# Patient Record
(demographics unavailable — no encounter records)

---

## 2024-11-05 NOTE — DISCUSSION/SUMMARY
[de-identified] : (Impression) -right shoulder superior labral tear versus rotator cuff tendonitis  The diagnosis was explained in detail. The potential non-surgical and surgical treatments were reviewed. The relative risks and benefits of each option were considered relative to the patient age, activity level, medical history, symptom severity and previously attempted treatments.  The patient was advised to consult with their primary medical provider prior to initiation of any new medications to reduce the risk of adverse effects specific to their long-term home medications and medical history. The risk of gastrointestinal irritation and kidney injury specific to long-term NSAID use was discussed.    (Plan)  -MRI arthrogram of the shoulder recommended to evaluate for labral and/or rotator cuff tear.  -Celebrex for pain and inflammation, take as needed. -Recommend rest from throwing at this time.  -Follow up when imaging completed. Will llikely consider PT before surgical options.      (MDM) Problem Complexity -Moderate: acute, complicated injury   Risk -Moderate: prescription medication  Visit Type -New: Patient has not been seen by another provider in my practice within the past 2 years who specializes in orthopedic surgery.

## 2024-11-05 NOTE — IMAGING
[de-identified] : (Exam: Shoulder)   Laterality is right    Patient is in no acute distress, alert and oriented Sensation is grossly intact to light touch in the hand Motor function is 5/5 in the hand Capillary refill is less than 2 seconds in the fingers Lymphadenopathy is not present Peripheral edema is not present   Skin is intact Swelling is not present Atrophy is not present Scapular winging is not present Deformity of the AC joint is not present Deformity of the biceps is not present   Bicipital groove tenderness is present AC joint tenderness is not present Scapulothoracic tenderness is not present   Active forward elevation is 170 Passive forward elevation is 170 External rotation at the side is 60 Internal rotation behind the back is to the level of T12   Forward elevation strength is 4/5 External rotation strength at the side is 4/5 Internal rotation strength at the side is 5/5 Deltoid strength of anterior, posterior and lateral heads is 5/5   Walters test is abnormal OBriens test is abnormal Empty can test is abnormal Speeds test is abnormal Cross body adduction test is normal Belly press test is normal Apprehension and relocation is normal Sulcus sign is normal [Right] : right shoulder [There are no fractures, subluxations or dislocations. No significant abnormalities are seen] : There are no fractures, subluxations or dislocations. No significant abnormalities are seen

## 2024-11-05 NOTE — HISTORY OF PRESENT ILLNESS
[de-identified] : Date of initial evaluation: 11/05/2024 Patient age: 17 year Body part causing symptoms: right shoulder Location of the pain: posterior, anterior Pain score today: 4/10 Duration of symptoms: 1 month History of injury: patient plays college baseball. reports gradual pain in the shoulder with throwing, likely overuse without an acute injury. reports preceding history of elbow injury that may have altered his mechanics.  Activities that worsen the pain: lifting weights, throwing Radicular symptoms: none Medications attempted for this problem: none PT for this problem: none Injections for this problem: none Previous surgery on this body part: none Prior imaging: none Occupation: student

## 2024-11-26 NOTE — DATA REVIEWED
[MRI] : MRI [Right] : of the right [Shoulder] : shoulder [I independently reviewed and interpreted images and report] : I independently reviewed and interpreted images and report [FreeTextEntry1] : intact labrum and rotator cuff

## 2024-11-26 NOTE — HISTORY OF PRESENT ILLNESS
[de-identified] : 11/26/2024: f/u for right shoulder, MRI results.  Taking Celebrex prn.  Has been resting from throwing.  Reports no change in symptoms since last visit.   Date of initial evaluation: 11/05/2024 Patient age: 17 year Body part causing symptoms: right shoulder Location of the pain: posterior, anterior Pain score today: 4/10 Duration of symptoms: 1 month History of injury: patient plays college baseball. reports gradual pain in the shoulder with throwing, likely overuse without an acute injury. reports preceding history of elbow injury that may have altered his mechanics.  Activities that worsen the pain: lifting weights, throwing Radicular symptoms: none Medications attempted for this problem: none PT for this problem: none Injections for this problem: none Previous surgery on this body part: none Prior imaging: none Occupation: student

## 2024-11-26 NOTE — DISCUSSION/SUMMARY
[de-identified] : (Impression) -right shoulder rotator cuff tendonitis  The diagnosis was explained in detail. The potential non-surgical and surgical treatments were reviewed. The relative risks and benefits of each option were considered relative to the patient age, activity level, medical history, symptom severity and previously attempted treatments.  The patient was advised to consult with their primary medical provider prior to initiation of any new medications to reduce the risk of adverse effects specific to their long-term home medications and medical history. The risk of gastrointestinal irritation and kidney injury specific to long-term NSAID use was discussed.    (Plan)  -Physical therapy recommended for stretching and strengthening. -Celebrex for pain and inflammation, take as needed. -Recommend rest from throwing for 1 month then return as symptoms permit.  -Follow up in 2 months, if symptoms persist consider CSI.      (MDM) Problem Complexity -Moderate: acute, complicated injury   Risk -Moderate: prescription medication  Visit Type -Established

## 2024-11-26 NOTE — IMAGING
[Right] : right shoulder [There are no fractures, subluxations or dislocations. No significant abnormalities are seen] : There are no fractures, subluxations or dislocations. No significant abnormalities are seen [de-identified] : (Exam: Shoulder)   Laterality is right    Patient is in no acute distress, alert and oriented Sensation is grossly intact to light touch in the hand Motor function is 5/5 in the hand Capillary refill is less than 2 seconds in the fingers Lymphadenopathy is not present Peripheral edema is not present   Skin is intact Swelling is not present Atrophy is not present Scapular winging is not present Deformity of the AC joint is not present Deformity of the biceps is not present   Bicipital groove tenderness is present AC joint tenderness is not present Scapulothoracic tenderness is not present   Active forward elevation is 170 Passive forward elevation is 170 External rotation at the side is 60 Internal rotation behind the back is to the level of T12   Forward elevation strength is 4/5 External rotation strength at the side is 4/5 Internal rotation strength at the side is 5/5 Deltoid strength of anterior, posterior and lateral heads is 5/5   Walters test is abnormal OBriens test is abnormal Empty can test is abnormal Speeds test is abnormal Cross body adduction test is normal Belly press test is normal Apprehension and relocation is normal Sulcus sign is normal

## 2025-03-17 NOTE — DISCUSSION/SUMMARY
[de-identified] : (Impression) -right cubital tunnel syndrome vs cervical radiculopathy -right shoulder rotator cuff tendonitis (resolved)  The diagnosis was explained in detail. The potential non-surgical and surgical treatments were reviewed. The relative risks and benefits of each option were considered relative to the patient age, activity level, medical history, symptom severity and previously attempted treatments.  The patient was advised to consult with their primary medical provider prior to initiation of any new medications to reduce the risk of adverse effects specific to their long-term home medications and medical history. The risk of gastrointestinal irritation and kidney injury specific to long-term NSAID use was discussed.    (Plan)  -EMG of right upper extremity ordered to evaluate for peripheral vs cervical nerve dysfunction.  -Continue shoulder physical therapy, transition to HEP. -Celebrex for pain and inflammation, take as needed. -Activity as tolerated.  -MRI deferred at this time.  -Follow up when EMG completed.      (MDM) Problem Complexity -Moderate: chronic illness with exacerbation   Risk -Moderate: prescription medication  Visit Type -Established

## 2025-03-17 NOTE — HISTORY OF PRESENT ILLNESS
[de-identified] : 03/17/2025: f/u for right shoulder. reports shooting pains from his posterior arm and medial elbow into his fingers, with associated numbness. he states that shoulder pain is improved from last visit with PT. taking Celebrex prn. attending PT for his shoulder. he reports history of low grade partial elbow UCL tear that resolved without surgery. he reports mild neck pain. he denies focal injury associated with his symptoms.   11/26/2024: f/u for right shoulder, MRI results.  Taking Celebrex prn.  Has been resting from throwing.  Reports no change in symptoms since last visit.   Date of initial evaluation: 11/05/2024 Patient age: 17 year Body part causing symptoms: right shoulder Location of the pain: posterior, anterior Pain score today: 4/10 Duration of symptoms: 1 month History of injury: patient plays college baseball. reports gradual pain in the shoulder with throwing, likely overuse without an acute injury. reports preceding history of elbow injury that may have altered his mechanics.  Activities that worsen the pain: lifting weights, throwing Radicular symptoms: none Medications attempted for this problem: none PT for this problem: none Injections for this problem: none Previous surgery on this body part: none Prior imaging: none Occupation: student

## 2025-03-17 NOTE — IMAGING
[Right] : right shoulder [There are no fractures, subluxations or dislocations. No significant abnormalities are seen] : There are no fractures, subluxations or dislocations. No significant abnormalities are seen [de-identified] : (Exam: Shoulder)   Laterality is right    Patient is in no acute distress, alert and oriented Sensation is grossly intact to light touch in the hand Motor function is 5/5 in the hand Capillary refill is less than 2 seconds in the fingers Lymphadenopathy is not present Peripheral edema is not present   Skin is intact Swelling is not present Atrophy is not present Scapular winging is not present Deformity of the AC joint is not present Deformity of the biceps is not present   Bicipital groove tenderness is not present AC joint tenderness is not present Scapulothoracic tenderness is not present   Active forward elevation is 175 Passive forward elevation is 175 External rotation at the side is 80 Internal rotation behind the back is to the level of T7   Forward elevation strength is 5/5 External rotation strength at the side is 5/5 Internal rotation strength at the side is 5/5 Deltoid strength of anterior, posterior and lateral heads is 5/5   Walters test is normal OBriens test is normal Empty can test is normal Speeds test is normal Cross body adduction test is normal Belly press test is normal Apprehension and relocation is normal Sulcus sign is normal  (Exam: Elbow)   Laterality is right    Skin is intact Olecranon bursa swelling is not present Biceps deformity is not present Intrinsic atrophy is not present   Lateral epicondyle tenderness is not present Medial epicondyle tenderness is not present Ulnar nerve tenderness is present in cubital tunnel Radial head tenderness is not present Biceps tenderness is not present Triceps tenderness is not present   Extension is 0 Flexion is 140 Pronation is 80 Supination is 80   Flexion strength is 5/5 Extension strength is 5/5 Pronation strength is 5/5 Supination strength is 5/5   Cozen's test is normal Biceps hook test is normal Tinel's test of ulnar nerve is abnormal Moving valgus stress test is normal  (Exam: Cervical Spine)  Skin is intact Swelling is not present Atrophy is not present   Bony tenderness is not present Paraspinal tenderness is not present Bony stepoff is not present   Range of motion is normal   Shoulder abduction strength is 5/5 Elbow flexion strength is 5/5 Elbow extension strength is 5/5 Wrist flexion strength is 5/5 Wrist extension strength is 5/5 Finger abduction strength is 5/5   C5-T1 sensation is intact  Biceps, triceps and patellar reflexes are 2+ and symmetric  Clonus is not present Chowdhury's sign is not present Spurling's test is normal

## 2025-03-31 NOTE — IMAGING
[de-identified] : (Exam: Shoulder)   Laterality is right    Patient is in no acute distress, alert and oriented Sensation is grossly intact to light touch in the hand Motor function is 5/5 in the hand Capillary refill is less than 2 seconds in the fingers Lymphadenopathy is not present Peripheral edema is not present   Skin is intact Swelling is not present Atrophy is not present Scapular winging is not present Deformity of the AC joint is not present Deformity of the biceps is not present   Bicipital groove tenderness is not present AC joint tenderness is not present Scapulothoracic tenderness is not present   Active forward elevation is 175 Passive forward elevation is 175 External rotation at the side is 80 Internal rotation behind the back is to the level of T7   Forward elevation strength is 5/5 External rotation strength at the side is 5/5 Internal rotation strength at the side is 5/5 Deltoid strength of anterior, posterior and lateral heads is 5/5   Walters test is normal OBriens test is normal Empty can test is normal Speeds test is normal Cross body adduction test is normal Belly press test is normal Apprehension and relocation is normal Sulcus sign is normal  (Exam: Elbow)   Laterality is right    Skin is intact Olecranon bursa swelling is not present Biceps deformity is not present Intrinsic atrophy is not present   Lateral epicondyle tenderness is not present Medial epicondyle tenderness is not present Ulnar nerve tenderness is present in cubital tunnel Radial head tenderness is not present Biceps tenderness is not present Triceps tenderness is not present   Extension is 0 Flexion is 140 Pronation is 80 Supination is 80   Flexion strength is 5/5 Extension strength is 5/5 Pronation strength is 5/5 Supination strength is 5/5   Cozen's test is normal Biceps hook test is normal Tinel's test of ulnar nerve is abnormal Moving valgus stress test is normal  (Exam: Cervical Spine)  Skin is intact Swelling is not present Atrophy is not present   Bony tenderness is not present Paraspinal tenderness is not present Bony stepoff is not present   Range of motion is normal   Shoulder abduction strength is 5/5 Elbow flexion strength is 5/5 Elbow extension strength is 5/5 Wrist flexion strength is 5/5 Wrist extension strength is 5/5 Finger abduction strength is 5/5   C5-T1 sensation is intact  Biceps, triceps and patellar reflexes are 2+ and symmetric  Clonus is not present Chowdhury's sign is not present Spurling's test is normal

## 2025-03-31 NOTE — DISCUSSION/SUMMARY
[de-identified] : (Impression) -right elbow cubital tunnel syndrome -right shoulder rotator cuff tendonitis (resolved)  The diagnosis was explained in detail. The potential non-surgical and surgical treatments were reviewed. The relative risks and benefits of each option were considered relative to the patient age, activity level, medical history, symptom severity and previously attempted treatments.  The patient was advised to consult with their primary medical provider prior to initiation of any new medications to reduce the risk of adverse effects specific to their long-term home medications and medical history. The risk of gastrointestinal irritation and kidney injury specific to long-term NSAID use was discussed.    (Plan)  -Patient reports elbow symptoms are mild at this time and he would like to continue playing sports. -Recommend use of cubital tunnel brace at night. -Continue shoulder and elbow HEP. Additional PT is deferred. -Continue sports as symptoms permit. -Discussed that if symptoms persist he would be candidate for right elbow ulnar nerve transposition.  -Celebrex for pain and inflammation, take as needed. -Elbow MRI deferred at this time.  -Follow up as needed.      (MDM) Problem Complexity -Moderate: chronic illness with exacerbation   Risk -Moderate: prescription medication  Visit Type -Established

## 2025-03-31 NOTE — HISTORY OF PRESENT ILLNESS
[de-identified] : 03/31/2025: f/u for right shoulder, EMG results which shows mild cubital tunnel. reports mild improvement from last visit. taking Celebrex prn.  03/17/2025: f/u for right shoulder. reports shooting pains from his posterior arm and medial elbow into his fingers, with associated numbness. he states that shoulder pain is improved from last visit with PT. taking Celebrex prn. attending PT for his shoulder. he reports history of low grade partial elbow UCL tear that resolved without surgery. he reports mild neck pain. he denies focal injury associated with his symptoms.   11/26/2024: f/u for right shoulder, MRI results.  Taking Celebrex prn.  Has been resting from throwing.  Reports no change in symptoms since last visit.   Date of initial evaluation: 11/05/2024 Patient age: 17 year Body part causing symptoms: right shoulder Location of the pain: posterior, anterior Pain score today: 4/10 Duration of symptoms: 1 month History of injury: patient plays college baseball. reports gradual pain in the shoulder with throwing, likely overuse without an acute injury. reports preceding history of elbow injury that may have altered his mechanics.  Activities that worsen the pain: lifting weights, throwing Radicular symptoms: none Medications attempted for this problem: none PT for this problem: none Injections for this problem: none Previous surgery on this body part: none Prior imaging: none Occupation: student

## 2025-07-08 NOTE — IMAGING
[de-identified] :   LEFT HIP and THIGH Inspection: no hamstring swelling Palpation: no hamstring tenderness  Range of Motion:  full internal rotation, full external rotation, pain with extension Strength: 5/5 Flexion, 5-/5 Extension, 5/5 Abduction, 5/5/ Adduction Neurological: light touch is intact throughout Special Tests: Impingement: neg Groin pain with IR: neg Debora: neg Pain in posterior thigh with leg lift and knee bent: neg

## 2025-07-08 NOTE — HISTORY OF PRESENT ILLNESS
[de-identified] : 18 year old male  ( [facility] )  left hamstring pain since 6/12/25 playing baseball was running after the ball and felt a pop in back of thigh The pain is located posterior, deep  The pain is associated with weakness Worse with activity and better at rest. Has tried ice, Tylenol , advil , activity mod  6/17/25 - mod activity, still pain, had mri 7/8/25 - doing PT with Mauri and improving

## 2025-07-08 NOTE — IMAGING
[de-identified] :   LEFT HIP and THIGH Inspection: no hamstring swelling Palpation: no hamstring tenderness  Range of Motion:  full internal rotation, full external rotation, pain with extension Strength: 5/5 Flexion, 5-/5 Extension, 5/5 Abduction, 5/5/ Adduction Neurological: light touch is intact throughout Special Tests: Impingement: neg Groin pain with IR: neg Debora: neg Pain in posterior thigh with leg lift and knee bent: neg

## 2025-07-08 NOTE — ASSESSMENT
[FreeTextEntry1] : mri left thigh 6/16/25 - bicep femoris strain    - We discussed their diagnosis and treatment options at length including the risks and benefits of both surgical and non-surgical options. Surgical risks include but are not limited to pain, infection, bleeding, vascular injury, numbness, tingling, nerve damage. - Due to risks of surgery, they will continue conservative treatment with PT, icing, and anti-inflammatory medications. - The patient was provided with a prescription for Physical Therapy. - The patient is to continue home exercises learned at physical therapy - The patient was advised to let pain guide the gradual advancement of activities.

## 2025-07-15 NOTE — HISTORY OF PRESENT ILLNESS
[de-identified] : 18 year old male  ( [facility] )  left hamstring pain since 6/12/25 playing baseball was running after the ball and felt a pop in back of thigh The pain is located posterior, deep  The pain is associated with weakness Worse with activity and better at rest. Has tried ice, Tylenol , advil , activity mod  6/17/25 - mod activity, still pain, had mri 7/8/25 - doing PT with Mauri and improving   There are no Wet Read(s) to document.